# Patient Record
Sex: MALE | Race: WHITE | NOT HISPANIC OR LATINO | URBAN - NONMETROPOLITAN AREA
[De-identification: names, ages, dates, MRNs, and addresses within clinical notes are randomized per-mention and may not be internally consistent; named-entity substitution may affect disease eponyms.]

---

## 2019-08-26 ENCOUNTER — IMPORTED ENCOUNTER (OUTPATIENT)
Dept: URBAN - NONMETROPOLITAN AREA CLINIC 1 | Facility: CLINIC | Age: 79
End: 2019-08-26

## 2019-08-26 PROBLEM — H40.013: Noted: 2019-08-26

## 2019-08-26 PROBLEM — H25.13: Noted: 2019-08-26

## 2019-08-26 PROBLEM — H25.013: Noted: 2019-08-26

## 2019-08-26 PROBLEM — H52.03: Noted: 2019-08-26

## 2019-08-26 PROBLEM — H52.4: Noted: 2019-08-26

## 2019-08-26 PROCEDURE — 92015 DETERMINE REFRACTIVE STATE: CPT

## 2019-08-26 PROCEDURE — 92014 COMPRE OPH EXAM EST PT 1/>: CPT

## 2019-08-26 NOTE — PATIENT DISCUSSION
Discussed cataracts OU - wants to wait. BF rx given.; 's Notes: Loves fishing the [employer]. Does aerobics 5x/wk. Former [place]  in [employer]. 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

## 2020-07-20 ENCOUNTER — IMPORTED ENCOUNTER (OUTPATIENT)
Dept: URBAN - NONMETROPOLITAN AREA CLINIC 1 | Facility: CLINIC | Age: 80
End: 2020-07-20

## 2020-07-20 PROBLEM — H40.013: Noted: 2020-07-20

## 2020-07-20 PROBLEM — H04.123: Noted: 2020-07-20

## 2020-07-20 PROBLEM — H25.013: Noted: 2020-07-20

## 2020-07-20 PROBLEM — H25.13: Noted: 2020-07-20

## 2020-07-20 PROCEDURE — 92012 INTRM OPH EXAM EST PATIENT: CPT

## 2020-07-20 NOTE — PATIENT DISCUSSION
TRICIA-Explained TRICIA and associated symptoms.-Recommend increasing Omega 3s.-Pt to begin artificial tears OU QID PRN. Pt will contact us if this does not provide relief. Consider punctal plugs in that case. start pf qid ou x 1wk; 's Notes: Loves fishing the [employer]. Does aerobics 5x/wk. Former [place]  in [employer]. 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

## 2021-07-28 ENCOUNTER — IMPORTED ENCOUNTER (OUTPATIENT)
Dept: URBAN - NONMETROPOLITAN AREA CLINIC 1 | Facility: CLINIC | Age: 81
End: 2021-07-28

## 2021-07-28 PROBLEM — H40.013: Noted: 2021-07-28

## 2021-07-28 PROBLEM — H25.013: Noted: 2021-07-28

## 2021-07-28 PROBLEM — H25.13: Noted: 2021-07-28

## 2021-07-28 PROBLEM — H25.012: Noted: 2021-07-28

## 2021-07-28 PROCEDURE — 99213 OFFICE O/P EST LOW 20 MIN: CPT

## 2021-07-28 NOTE — PATIENT DISCUSSION
Cataract OU +progression os>od-Not yet surgical. -Reviewed symptoms of advancing cataract growth such as glare and halos and decreased vision.-Continue to monitor for now. Pt will notify us if any new symptoms develop.; 's Notes: Loves fishing the [employer]. Does aerobics 5x/wk. Former [place]  in [employer]. 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

## 2022-04-10 ASSESSMENT — TONOMETRY
OS_IOP_MMHG: 20
OD_IOP_MMHG: 20
OS_IOP_MMHG: 18
OD_IOP_MMHG: 20
OD_IOP_MMHG: 20
OS_IOP_MMHG: 20

## 2022-04-10 ASSESSMENT — VISUAL ACUITY
OS_CC: 20/50-1
OS_CC: 20/50
OD_CC: 20/40
OD_CC: 20/25
OS_CC: 20/80-2
OD_CC: 20/30

## 2023-05-15 ENCOUNTER — COMPREHENSIVE EXAM (OUTPATIENT)
Dept: RURAL CLINIC 1 | Facility: CLINIC | Age: 83
End: 2023-05-15

## 2023-05-15 DIAGNOSIS — H25.13: ICD-10-CM

## 2023-05-15 DIAGNOSIS — H25.013: ICD-10-CM

## 2023-05-15 PROCEDURE — 92015 DETERMINE REFRACTIVE STATE: CPT

## 2023-05-15 PROCEDURE — 92014 COMPRE OPH EXAM EST PT 1/>: CPT

## 2023-05-15 ASSESSMENT — TONOMETRY
OD_IOP_MMHG: 20
OS_IOP_MMHG: 20

## 2023-05-15 ASSESSMENT — VISUAL ACUITY
OS_PH: 20/25
OD_PH: 20/25
OS_SC: 20/70
OD_SC: 20/40-1

## 2024-01-08 ENCOUNTER — EMERGENCY VISIT (OUTPATIENT)
Dept: RURAL CLINIC 1 | Facility: CLINIC | Age: 84
End: 2024-01-08

## 2024-01-08 DIAGNOSIS — H25.13: ICD-10-CM

## 2024-01-08 DIAGNOSIS — H25.013: ICD-10-CM

## 2024-01-08 PROCEDURE — 92014 COMPRE OPH EXAM EST PT 1/>: CPT

## 2024-01-08 ASSESSMENT — VISUAL ACUITY
OD_SC: 20/100
OD_PH: 20/50
OS_PH: 20/50
OS_SC: 20/80

## 2024-01-08 ASSESSMENT — TONOMETRY
OD_IOP_MMHG: 24
OS_IOP_MMHG: 23

## 2024-02-05 ENCOUNTER — CONSULTATION/EVALUATION (OUTPATIENT)
Dept: RURAL CLINIC 1 | Facility: CLINIC | Age: 84
End: 2024-02-05

## 2024-02-05 DIAGNOSIS — H25.013: ICD-10-CM

## 2024-02-05 PROCEDURE — 92134 CPTRZ OPH DX IMG PST SGM RTA: CPT

## 2024-02-05 PROCEDURE — 92136 OPHTHALMIC BIOMETRY: CPT

## 2024-02-05 PROCEDURE — 99214 OFFICE O/P EST MOD 30 MIN: CPT

## 2024-02-05 PROCEDURE — 92025 CPTRIZED CORNEAL TOPOGRAPHY: CPT

## 2024-02-05 ASSESSMENT — VISUAL ACUITY
OD_SC: 20/200
OD_PAM: 20/50
OD_PH: 20/60
OU_SC: 20/80-1
OS_BAT: 20/400
OS_PH: 20/70
OD_BAT: 20/400
OS_AM: 20/40
OS_SC: 20/80-2

## 2024-02-05 ASSESSMENT — TONOMETRY
OD_IOP_MMHG: 20
OS_IOP_MMHG: 20

## 2024-02-29 ENCOUNTER — PRE-OP/H&P (OUTPATIENT)
Dept: RURAL CLINIC 1 | Facility: CLINIC | Age: 84
End: 2024-02-29

## 2024-02-29 VITALS
HEART RATE: 67 BPM | HEIGHT: 68 IN | DIASTOLIC BLOOD PRESSURE: 71 MMHG | SYSTOLIC BLOOD PRESSURE: 112 MMHG | BODY MASS INDEX: 25.91 KG/M2 | WEIGHT: 171 LBS

## 2024-02-29 DIAGNOSIS — Z01.818: ICD-10-CM

## 2024-02-29 PROCEDURE — 99499 UNLISTED E&M SERVICE: CPT

## 2024-03-12 ENCOUNTER — SURGERY/PROCEDURE (OUTPATIENT)
Dept: URBAN - METROPOLITAN AREA SURGERY 3 | Facility: SURGERY | Age: 84
End: 2024-03-12

## 2024-03-12 DIAGNOSIS — H25.013: ICD-10-CM

## 2024-03-12 PROCEDURE — 66984AV REMOVE CATARACT, INSERT ADVANCED LENS

## 2024-03-12 PROCEDURE — 99199PAVC ADVANCED VISION PACKAGE CO-MANAGE

## 2024-03-12 PROCEDURE — 99199PAV PROF ADVANCED VISION PACKAGE

## 2024-03-12 PROCEDURE — 68841 INSJ RX ELUT IMPLT LAC CANAL: CPT

## 2024-03-13 ENCOUNTER — POST-OP (OUTPATIENT)
Dept: RURAL CLINIC 1 | Facility: CLINIC | Age: 84
End: 2024-03-13

## 2024-03-13 DIAGNOSIS — Z96.1: ICD-10-CM

## 2024-03-13 DIAGNOSIS — H25.012: ICD-10-CM

## 2024-03-13 PROCEDURE — 99024 POSTOP FOLLOW-UP VISIT: CPT

## 2024-03-13 ASSESSMENT — VISUAL ACUITY: OD_SC: 20/30-2

## 2024-03-13 ASSESSMENT — TONOMETRY: OD_IOP_MMHG: 19

## 2024-03-20 ENCOUNTER — POST OP/EVAL OF SECOND EYE (OUTPATIENT)
Dept: RURAL CLINIC 1 | Facility: CLINIC | Age: 84
End: 2024-03-20

## 2024-03-20 DIAGNOSIS — H25.012: ICD-10-CM

## 2024-03-20 PROCEDURE — 99213 OFFICE O/P EST LOW 20 MIN: CPT

## 2024-03-20 ASSESSMENT — TONOMETRY
OS_IOP_MMHG: 17
OD_IOP_MMHG: 17

## 2024-03-20 ASSESSMENT — VISUAL ACUITY
OD_SC: 20/60
OS_SC: 20/80

## 2024-03-26 ENCOUNTER — SURGERY/PROCEDURE (OUTPATIENT)
Dept: URBAN - METROPOLITAN AREA SURGERY 3 | Facility: SURGERY | Age: 84
End: 2024-03-26

## 2024-03-26 DIAGNOSIS — Z96.1: ICD-10-CM

## 2024-03-26 PROCEDURE — 66984AV REMOVE CATARACT, INSERT ADVANCED LENS

## 2024-03-26 PROCEDURE — 68841 INSJ RX ELUT IMPLT LAC CANAL: CPT

## 2024-03-26 PROCEDURE — 99199PAV PROF ADVANCED VISION PACKAGE

## 2024-03-27 ENCOUNTER — POST-OP (OUTPATIENT)
Dept: RURAL CLINIC 1 | Facility: CLINIC | Age: 84
End: 2024-03-27

## 2024-03-27 DIAGNOSIS — Z96.1: ICD-10-CM

## 2024-03-27 PROCEDURE — 99024 POSTOP FOLLOW-UP VISIT: CPT

## 2024-03-27 ASSESSMENT — TONOMETRY
OS_IOP_MMHG: 26
OD_IOP_MMHG: 24

## 2024-03-27 ASSESSMENT — VISUAL ACUITY
OS_SC: 20/25+2
OU_SC: 20/20-1
OD_SC: 20/50

## 2024-04-03 ENCOUNTER — POST-OP (OUTPATIENT)
Dept: RURAL CLINIC 1 | Facility: CLINIC | Age: 84
End: 2024-04-03

## 2024-04-03 DIAGNOSIS — Z96.1: ICD-10-CM

## 2024-04-03 PROCEDURE — 99024 POSTOP FOLLOW-UP VISIT: CPT

## 2024-04-03 ASSESSMENT — TONOMETRY
OD_IOP_MMHG: 17
OS_IOP_MMHG: 17

## 2024-04-03 ASSESSMENT — VISUAL ACUITY
OD_SC: 20/60
OS_SC: 20/25-1
OU_SC: 20/20-2

## 2024-05-16 ENCOUNTER — FOLLOW UP (OUTPATIENT)
Dept: RURAL CLINIC 1 | Facility: CLINIC | Age: 84
End: 2024-05-16

## 2024-05-16 DIAGNOSIS — Z96.1: ICD-10-CM

## 2024-05-16 PROCEDURE — 99024 POSTOP FOLLOW-UP VISIT: CPT

## 2024-05-16 ASSESSMENT — TONOMETRY
OS_IOP_MMHG: 14
OD_IOP_MMHG: 24

## 2024-05-16 ASSESSMENT — VISUAL ACUITY
OS_SC: 20/20
OD_SC: 20/50-2

## 2024-05-23 ENCOUNTER — FOLLOW UP (OUTPATIENT)
Dept: RURAL CLINIC 1 | Facility: CLINIC | Age: 84
End: 2024-05-23

## 2024-05-23 DIAGNOSIS — H20.021: ICD-10-CM

## 2024-05-23 PROCEDURE — 99024 POSTOP FOLLOW-UP VISIT: CPT

## 2024-05-23 ASSESSMENT — VISUAL ACUITY
OD_SC: 20/30
OS_SC: 20/30

## 2024-06-13 ENCOUNTER — EMERGENCY VISIT (OUTPATIENT)
Dept: RURAL CLINIC 1 | Facility: CLINIC | Age: 84
End: 2024-06-13

## 2024-06-13 DIAGNOSIS — H20.021: ICD-10-CM

## 2024-06-13 DIAGNOSIS — Z96.1: ICD-10-CM

## 2024-06-13 PROCEDURE — 99024 POSTOP FOLLOW-UP VISIT: CPT

## 2024-06-13 ASSESSMENT — VISUAL ACUITY
OD_SC: 20/40
OD_SC: 20/50

## 2024-06-13 ASSESSMENT — TONOMETRY: OD_IOP_MMHG: 18

## 2024-11-11 ENCOUNTER — FOLLOW UP (OUTPATIENT)
Dept: RURAL CLINIC 1 | Facility: CLINIC | Age: 84
End: 2024-11-11

## 2024-11-11 DIAGNOSIS — H40.013: ICD-10-CM

## 2024-11-11 DIAGNOSIS — Z96.1: ICD-10-CM

## 2024-11-11 PROCEDURE — 99214 OFFICE O/P EST MOD 30 MIN: CPT

## 2024-11-11 PROCEDURE — 92083 EXTENDED VISUAL FIELD XM: CPT
